# Patient Record
Sex: FEMALE | Race: AMERICAN INDIAN OR ALASKA NATIVE | NOT HISPANIC OR LATINO | Employment: FULL TIME | URBAN - METROPOLITAN AREA
[De-identification: names, ages, dates, MRNs, and addresses within clinical notes are randomized per-mention and may not be internally consistent; named-entity substitution may affect disease eponyms.]

---

## 2024-08-28 ENCOUNTER — OFFICE VISIT (OUTPATIENT)
Dept: FAMILY MEDICINE | Facility: CLINIC | Age: 29
End: 2024-08-28
Payer: COMMERCIAL

## 2024-08-28 ENCOUNTER — ANCILLARY PROCEDURE (OUTPATIENT)
Dept: GENERAL RADIOLOGY | Facility: CLINIC | Age: 29
End: 2024-08-28
Attending: FAMILY MEDICINE
Payer: COMMERCIAL

## 2024-08-28 VITALS
WEIGHT: 232 LBS | RESPIRATION RATE: 13 BRPM | SYSTOLIC BLOOD PRESSURE: 124 MMHG | HEIGHT: 64 IN | DIASTOLIC BLOOD PRESSURE: 71 MMHG | HEART RATE: 95 BPM | OXYGEN SATURATION: 100 % | BODY MASS INDEX: 39.61 KG/M2 | TEMPERATURE: 98.1 F

## 2024-08-28 DIAGNOSIS — N76.0 BV (BACTERIAL VAGINOSIS): Primary | ICD-10-CM

## 2024-08-28 DIAGNOSIS — S63.634A SPRAIN OF INTERPHALANGEAL JOINT OF RIGHT RING FINGER, INITIAL ENCOUNTER: ICD-10-CM

## 2024-08-28 DIAGNOSIS — N89.8 VAGINAL DISCHARGE: Primary | ICD-10-CM

## 2024-08-28 DIAGNOSIS — B96.89 BV (BACTERIAL VAGINOSIS): Primary | ICD-10-CM

## 2024-08-28 DIAGNOSIS — Z13.6 CARDIOVASCULAR SCREENING; LDL GOAL LESS THAN 160: ICD-10-CM

## 2024-08-28 DIAGNOSIS — S93.401A SPRAIN OF RIGHT ANKLE, UNSPECIFIED LIGAMENT, INITIAL ENCOUNTER: ICD-10-CM

## 2024-08-28 LAB
CLUE CELLS: PRESENT
TRICHOMONAS, WET PREP: ABNORMAL
WBC'S/HIGH POWER FIELD, WET PREP: ABNORMAL
YEAST, WET PREP: ABNORMAL

## 2024-08-28 PROCEDURE — 73130 X-RAY EXAM OF HAND: CPT | Mod: TC | Performed by: RADIOLOGY

## 2024-08-28 PROCEDURE — 87210 SMEAR WET MOUNT SALINE/INK: CPT | Performed by: FAMILY MEDICINE

## 2024-08-28 PROCEDURE — G2211 COMPLEX E/M VISIT ADD ON: HCPCS | Performed by: FAMILY MEDICINE

## 2024-08-28 PROCEDURE — 99204 OFFICE O/P NEW MOD 45 MIN: CPT | Performed by: FAMILY MEDICINE

## 2024-08-28 RX ORDER — VITAMIN A ACETATE, BETA CAROTENE, ASCORBIC ACID, CHOLECALCIFEROL, .ALPHA.-TOCOPHEROL ACETATE, DL-, THIAMINE MONONITRATE, RIBOFLAVIN, NIACINAMIDE, PYRIDOXINE HYDROCHLORIDE, FOLIC ACID, CYANOCOBALAMIN, CALCIUM CARBONATE, FERROUS FUMARATE, ZINC OXIDE, CUPRIC OXIDE 3080; 12; 120; 400; 1; 1.84; 3; 20; 22; 920; 25; 200; 27; 10; 2 [IU]/1; UG/1; MG/1; [IU]/1; MG/1; MG/1; MG/1; MG/1; MG/1; [IU]/1; MG/1; MG/1; MG/1; MG/1; MG/1
1 TABLET, FILM COATED ORAL DAILY
COMMUNITY

## 2024-08-28 NOTE — PROGRESS NOTES
"  Assessment & Plan     Vaginal discharge    - REVIEW OF HEALTH MAINTENANCE PROTOCOL ORDERS  - Wet prep - Clinic Collect  - PRIMARY CARE FOLLOW-UP SCHEDULING    Sprain of interphalangeal joint of right ring finger, initial encounter    - REVIEW OF HEALTH MAINTENANCE PROTOCOL ORDERS  - XR Hand Right G/E 3 Views  - PRIMARY CARE FOLLOW-UP SCHEDULING    Sprain of right ankle, unspecified ligament, initial encounter    - REVIEW OF HEALTH MAINTENANCE PROTOCOL ORDERS  - PRIMARY CARE FOLLOW-UP SCHEDULING    CARDIOVASCULAR SCREENING; LDL GOAL LESS THAN 160    - REVIEW OF HEALTH MAINTENANCE PROTOCOL ORDERS  - PRIMARY CARE FOLLOW-UP SCHEDULING    BMI  Estimated body mass index is 39.82 kg/m  as calculated from the following:    Height as of this encounter: 1.626 m (5' 4\").    Weight as of this encounter: 105.2 kg (232 lb).     Plan:    1) Medications: pending results    2) Labs: wet prep pending    3) Immunizations: NA    4) Imaging/Diagnostics: xrays negative    5) Consults: OB/Gyn, consider FSOC    6) sx cares discussed, christina taping prn, tylenol prn    32 minutes spent by myself on the date of the encounter doing chart review, history and exam, documentation and further activities per the note.    The longitudinal plan of care for the diagnosis(es)/condition(s) as documented were addressed during this visit. Due to the added complexity in care, I will continue to support Lupe in the subsequent management and with ongoing continuity of care.    Mauricio Andrade is a 29 year old, presenting for the following health issues:  New Patient, Finger (Injury), and Ankle Injury        2024     4:28 PM   Additional Questions   Roomed by Ze PRESCOTT   Accompanied by Self     History of Present Illness       Reason for visit:  Ankle and finger injury  Symptom onset:  1-2 weeks ago   She is taking medications regularly.      at 35+ weeks - follows with [facility] in [place] - no issues to date    Concern - Right ring " Finger // Right Ankle injury - stepping up on slanted curb on 8/17 and 8/19, initially tripped, first inversion, second eversion - swelling mild in finger in ankle - improving overall - finger at 40%, ankle at 70%    Onset: x 9 days-- fell on knees and knuckle -concrete  Description: Right 4th finger, right ankle  Intensity: mild  Progression of Symptoms:  ankle was getting better until went to fair yesterday-- feels tight / finger still hurts -not throbbing any more  Accompanying Signs & Symptoms: Finger - swollen knuckle looks bruised // swollen - no bruising  Previous history of similar problem: injured ankle 10 months ago  Precipitating factors:        Worsened by: Certain movements, no pain with baring weight except stairs -- unable to do a pelvic floor squat --   Alleviating factors:        Improved by: iced bot - swelling decreased in ankle  Therapies tried and outcome: Ice, elevating    Had cream for yeast infection recently - feels not gone - miconazole.     Has discharge - left wet prep    Patient Active Problem List   Diagnosis    CARDIOVASCULAR SCREENING; LDL GOAL LESS THAN 160       Past Medical History:   Diagnosis Date    CARDIOVASCULAR SCREENING; LDL GOAL LESS THAN 160        Past Surgical History:   Procedure Laterality Date    DENTAL SURGERY  2019    wisdom teeth       Current Outpatient Medications   Medication Sig Dispense Refill    Prenatal Vit-Fe Fumarate-FA (PRENATAL PLUS) 27-1 MG TABS Take 1 tablet by mouth daily.         No Known Allergies    Family History   Adopted: Yes       Social History     Socioeconomic History    Marital status:      Spouse name: Ovidio    Number of children: 1    Years of education: 18    Highest education level: None   Tobacco Use    Smoking status: Never    Smokeless tobacco: Never   Vaping Use    Vaping status: Former    Substances: Nicotine   Substance and Sexual Activity    Alcohol use: Not Currently    Drug use: Never     Social Determinants of Health      Financial Resource Strain: Low Risk  (8/28/2024)    Financial Resource Strain     Within the past 12 months, have you or your family members you live with been unable to get utilities (heat, electricity) when it was really needed?: No   Food Insecurity: Low Risk  (8/28/2024)    Food Insecurity     Within the past 12 months, did you worry that your food would run out before you got money to buy more?: No     Within the past 12 months, did the food you bought just not last and you didn t have money to get more?: No   Transportation Needs: Low Risk  (8/28/2024)    Transportation Needs     Within the past 12 months, has lack of transportation kept you from medical appointments, getting your medicines, non-medical meetings or appointments, work, or from getting things that you need?: No    [facility]    Social Connections   Interpersonal Safety: Low Risk  (8/28/2024)    Interpersonal Safety     Do you feel physically and emotionally safe where you currently live?: Yes     Within the past 12 months, have you been hit, slapped, kicked or otherwise physically hurt by someone?: No     Within the past 12 months, have you been humiliated or emotionally abused in other ways by your partner or ex-partner?: No   Housing Stability: Low Risk  (8/28/2024)    Housing Stability     Do you have housing? : Yes     Are you worried about losing your housing?: No       Review of Systems  CONSTITUTIONAL: NEGATIVE for fever, chills, change in weight  INTEGUMENTARY/SKIN: NEGATIVE for worrisome rashes, moles or lesions  EYES: NEGATIVE for vision changes or irritation  ENT/MOUTH: NEGATIVE for ear, mouth and throat problems  RESP: NEGATIVE for significant cough or SOB  CV: NEGATIVE for chest pain, palpitations or peripheral edema  GI: NEGATIVE for nausea, abdominal pain, heartburn, or change in bowel habits  : NEGATIVE for frequency, dysuria, or hematuria  MUSCULOSKELETAL: NEGATIVE for  "significant arthralgias or myalgia  NEURO: NEGATIVE for weakness, dizziness or paresthesias  ENDOCRINE: NEGATIVE for temperature intolerance, skin/hair changes  HEME: NEGATIVE for bleeding problems  PSYCHIATRIC: NEGATIVE for changes in mood or affect      Objective    /71 (BP Location: Left arm, Patient Position: Chair, Cuff Size: Adult Regular)   Pulse 95   Temp 98.1  F (36.7  C) (Tympanic)   Resp 13   Ht 1.626 m (5' 4\")   Wt 105.2 kg (232 lb)   SpO2 100%   BMI 39.82 kg/m    Body mass index is 39.82 kg/m .    Physical Exam   GENERAL: alert and no distress  EYES: Eyes grossly normal to inspection, PERRL and conjunctivae and sclerae normal  HENT: ear canals and TM's normal, nose and mouth without ulcers or lesions  NECK: no adenopathy, no asymmetry, masses, or scars  RESP: lungs clear to auscultation - no rales, rhonchi or wheezes  CV: regular rate and rhythm, normal S1 S2, no S3 or S4, no murmur, click or rub, no peripheral edema  ABDOMEN: soft, nontender, no hepatosplenomegaly, no masses and bowel sounds normal  MS: swelling at PIP, ROM 80%, able to fully extend, 80% flexion, ankle moderate lateral swelling - o/w negative  SKIN: no suspicious lesions or rashes  NEURO: Normal strength and tone, mentation intact and speech normal  PSYCH: mentation appears normal, affect normal/bright    Wet prep pending    Declines Rt Ankle xray    Rt Hand      Signed Electronically by:              Yahir Coburn MD, FAAFP     [facility]  [address] [place] [id number]  [email or web address]   Office: [phone]  Fax: [phone]         "

## 2024-08-29 ENCOUNTER — MYC MEDICAL ADVICE (OUTPATIENT)
Dept: FAMILY MEDICINE | Facility: CLINIC | Age: 29
End: 2024-08-29
Payer: COMMERCIAL

## 2024-08-29 ENCOUNTER — TELEPHONE (OUTPATIENT)
Dept: FAMILY MEDICINE | Facility: CLINIC | Age: 29
End: 2024-08-29
Payer: COMMERCIAL

## 2024-08-29 DIAGNOSIS — B96.89 BV (BACTERIAL VAGINOSIS): Primary | ICD-10-CM

## 2024-08-29 DIAGNOSIS — N76.0 BV (BACTERIAL VAGINOSIS): Primary | ICD-10-CM

## 2024-08-29 RX ORDER — CLINDAMYCIN HCL 300 MG
300 CAPSULE ORAL 2 TIMES DAILY
Qty: 14 CAPSULE | Refills: 0 | Status: SHIPPED | OUTPATIENT
Start: 2024-08-29 | End: 2024-09-05

## 2024-08-29 RX ORDER — CLINDAMYCIN HCL 300 MG
300 CAPSULE ORAL 2 TIMES DAILY
Qty: 14 CAPSULE | Refills: 0 | Status: CANCELLED | OUTPATIENT
Start: 2024-08-29 | End: 2024-09-05

## 2024-08-29 NOTE — TELEPHONE ENCOUNTER
ASCENCION for call back and sent my chart     See below      Maddy Block RN       Your recent results have been reviewed.     They showed:     BV is noted     We advise:     Stop miconazole   Add Clindamycin 300 mg bid x 7 days, please sign after confirming pharmacy     For additional lab test information, [email or web address] is an excellent reference.     Let us know if you have any questions or concerns.     Thank you for choosing us for your health care needs!

## 2024-08-29 NOTE — TELEPHONE ENCOUNTER
Rx sent in.   See mychart encounter    DOUGLAS ALVAREZ RN on 8/29/2024 at 9:04 AM   [facility]

## 2024-10-06 ENCOUNTER — HEALTH MAINTENANCE LETTER (OUTPATIENT)
Age: 29
End: 2024-10-06

## 2025-01-23 ENCOUNTER — TELEPHONE (OUTPATIENT)
Dept: FAMILY MEDICINE | Facility: CLINIC | Age: 30
End: 2025-01-23
Payer: COMMERCIAL

## 2025-01-23 NOTE — TELEPHONE ENCOUNTER
Needs of attention regarding:  -Cervical Cancer Screening    Health Maintenance Topics with due status: Overdue       Topic Date Due    ADVANCE CARE PLANNING Never done    YEARLY PREVENTIVE VISIT Never done    INFLUENZA VACCINE Never done    COVID-19 Vaccine 09/01/2024    PHQ-2 (once per calendar year) 01/01/2025       Communication:  Please abstract the following data from this visit with this patient into the appropriate field in Epic:    Tests that can be patient reported without a hard copy:        Other Tests found in the patient's chart through Chart Review/Care Everywhere:    Pap smear done by this group [facility] on this date: 11/7/24    Note to [employer]straction: If this section is blank, no results were found via Chart Review/Care Everywhere.